# Patient Record
Sex: FEMALE | Race: WHITE | Employment: FULL TIME
[De-identification: names, ages, dates, MRNs, and addresses within clinical notes are randomized per-mention and may not be internally consistent; named-entity substitution may affect disease eponyms.]

---

## 2017-06-10 ENCOUNTER — HEALTH MAINTENANCE LETTER (OUTPATIENT)
Age: 37
End: 2017-06-10

## 2017-08-18 ENCOUNTER — OFFICE VISIT (OUTPATIENT)
Dept: FAMILY MEDICINE | Facility: CLINIC | Age: 37
End: 2017-08-18
Payer: COMMERCIAL

## 2017-08-18 VITALS
RESPIRATION RATE: 18 BRPM | DIASTOLIC BLOOD PRESSURE: 64 MMHG | BODY MASS INDEX: 36.32 KG/M2 | SYSTOLIC BLOOD PRESSURE: 132 MMHG | HEART RATE: 69 BPM | TEMPERATURE: 96.5 F | OXYGEN SATURATION: 97 % | HEIGHT: 66 IN | WEIGHT: 226 LBS

## 2017-08-18 DIAGNOSIS — Z80.1 FAMILY HISTORY OF LUNG CANCER: ICD-10-CM

## 2017-08-18 DIAGNOSIS — E66.01 MORBID OBESITY DUE TO EXCESS CALORIES (H): ICD-10-CM

## 2017-08-18 DIAGNOSIS — E55.9 VITAMIN D DEFICIENCY: ICD-10-CM

## 2017-08-18 DIAGNOSIS — R73.01 IMPAIRED FASTING GLUCOSE: ICD-10-CM

## 2017-08-18 DIAGNOSIS — Z87.891 PERSONAL HISTORY OF TOBACCO USE, PRESENTING HAZARDS TO HEALTH: ICD-10-CM

## 2017-08-18 DIAGNOSIS — E66.01 MORBID OBESITY DUE TO EXCESS CALORIES (H): Primary | ICD-10-CM

## 2017-08-18 DIAGNOSIS — E78.00 HYPERCHOLESTEROLEMIA: ICD-10-CM

## 2017-08-18 DIAGNOSIS — R53.83 FATIGUE, UNSPECIFIED TYPE: ICD-10-CM

## 2017-08-18 DIAGNOSIS — N90.7 LABIAL CYST: ICD-10-CM

## 2017-08-18 LAB
CHOLEST SERPL-MCNC: 166 MG/DL
GLUCOSE SERPL-MCNC: 89 MG/DL (ref 70–99)
HDLC SERPL-MCNC: 53 MG/DL
HGB BLD-MCNC: 14.8 G/DL (ref 11.7–15.7)
LDLC SERPL CALC-MCNC: 94 MG/DL
NONHDLC SERPL-MCNC: 113 MG/DL
TRIGL SERPL-MCNC: 93 MG/DL
TSH SERPL DL<=0.005 MIU/L-ACNC: 1.02 MU/L (ref 0.4–4)

## 2017-08-18 PROCEDURE — 82306 VITAMIN D 25 HYDROXY: CPT | Performed by: INTERNAL MEDICINE

## 2017-08-18 PROCEDURE — 36415 COLL VENOUS BLD VENIPUNCTURE: CPT | Performed by: INTERNAL MEDICINE

## 2017-08-18 PROCEDURE — 84443 ASSAY THYROID STIM HORMONE: CPT | Performed by: INTERNAL MEDICINE

## 2017-08-18 PROCEDURE — 82947 ASSAY GLUCOSE BLOOD QUANT: CPT | Performed by: INTERNAL MEDICINE

## 2017-08-18 PROCEDURE — 80061 LIPID PANEL: CPT | Performed by: INTERNAL MEDICINE

## 2017-08-18 PROCEDURE — 85018 HEMOGLOBIN: CPT | Performed by: INTERNAL MEDICINE

## 2017-08-18 PROCEDURE — 99215 OFFICE O/P EST HI 40 MIN: CPT | Performed by: FAMILY MEDICINE

## 2017-08-18 NOTE — NURSING NOTE
"Chief Complaint   Patient presents with     New Patient     Vaginal Problem     Derm Problem     /64  Pulse 69  Temp 96.5  F (35.8  C) (Tympanic)  Resp 18  Ht 5' 5.75\" (1.67 m)  Wt 226 lb (102.5 kg)  LMP  (Exact Date)  SpO2 97%  Breastfeeding? No  BMI 36.76 kg/m2 Estimated body mass index is 36.76 kg/(m^2) as calculated from the following:    Height as of this encounter: 5' 5.75\" (1.67 m).    Weight as of this encounter: 226 lb (102.5 kg).  BP completed using cuff size: rocio Tolliver CMA    Health Maintenance Due   Topic Date Due     TOBACCO CESSATION COUNSELING Q1 YR  1978     LIPID MONITORING Q1 YEAR  11/01/2013     MAMMO Q1 YR  10/16/2014     Health Maintenance reviewed at today's visit patient asked to schedule/complete:   None, Health Maintenance up to date.  Breast Cancer:  Patient agrees to schedule    "

## 2017-08-18 NOTE — LETTER
Jaceksilvestrevidhi EMELI Luba  [address] [place] [id number]        August 22, 2017          Dear ,    We are writing to inform you of your test results.    They are all normal     THE FOLLOWING ARE EXPLANATIONS OF SOME OF OUR LAB TESTS     YOU DID NOT NECESSARILY HAVE ALL OF THESE DONE     Hgb is the blood iron level   WBC means White Blood Cells   Platelets are small blood cells that help with forming the blood clots along with other blood factors.   Electrolytes are Sodium, Potassium, Calcium, Magnesium, Phosphorus.   Liver tests are: AST, ALT, Bilirubin, Alkaline Phosphatase.   Kidney tests are Creatinine, GFR.   HDL Cholesterol - is the good cholesterol and it is good to have it high.   LDL cholesterol is the bad cholesterol and it is good to have it low.   It is recommended to have LDL less than 130 for people with hypertension and to have it less than 100 for people with heart disease, diabetes and chronic kidney disease.   Triglycerides are another type of lipid that can cause heart disease, like the cholesterol and should be kept low   Thyroid tests are TSH, T4, T3   Glucose is sugar.   A1c is a test that gives us an idea about how well was controlled the diabetes for the last 3 months.   PSA stands for Prostate Specific Antigen and it can be elevated with prostate cancer or prostate inflammation.     Your vitamin D is normal.  Please take 1,000 units in the summer and 2,000 units in the winter to maintain it     Please continue on the same medications     Resulted Orders   TSH with free T4 reflex   Result Value Ref Range    TSH 1.02 0.40 - 4.00 mU/L   Glucose   Result Value Ref Range    Glucose 89 70 - 99 mg/dL      Comment:      Non Fasting   Lipid Profile   Result Value Ref Range    Cholesterol 166 <200 mg/dL    Triglycerides 93 <150 mg/dL      Comment:      Non Fasting    HDL Cholesterol 53 >49 mg/dL    LDL Cholesterol Calculated 94 <100 mg/dL      Comment:      Desirable:       <100 mg/dl     Non HDL Cholesterol 113 <130 mg/dL   Hemoglobin   Result Value Ref Range    Hemoglobin 14.8 11.7 - 15.7 g/dL   Vitamin D Deficiency   Result Value Ref Range    Vitamin D Deficiency screening 21 20 - 75 ug/L      Comment:      Season, race, dietary intake, and treatment affect the concentration of   25-hydroxy-Vitamin D. Values may decrease during winter months and increase   during summer months. Values 20-29 ug/L may indicate Vitamin D insufficiency   and values <20 ug/L may indicate Vitamin D deficiency.  Vitamin D determination is routinely performed by an immunoassay specific for   25 hydroxyvitamin D3.  If an individual is on vitamin D2 (ergocalciferol)   supplementation, please specify 25 OH vitamin D2 and D3 level determination by   LCMSMS test [id number].         If you have any questions or concerns, please call the clinic at the number listed above.       Sincerely,        Ivelisse Gonzalez MD

## 2017-08-18 NOTE — MR AVS SNAPSHOT
After Visit Summary   8/18/2017    Ancelmo Verduzco    MRN: [id number]           Patient Information     Date Of Birth          1980        Visit Information        Provider Department      8/18/2017 10:00 AM Ivelisse Gonzalez MD [facility]        Today's Diagnoses     Morbid obesity due to excess calories (H): BMI+=36.8 w comorbid glu intol, hi LDL     -  1    Visit for screening mammogram        Fatigue, unspecified type        Screening cholesterol level        Encounter for screening mammogram for breast cancer        Personal history of tobacco use, presenting hazards to health: [age]y/o @ 1ppd=18pk yr hx-issue of treating         Hypercholesterolemia        Impaired fasting glucose        Family history of lung cancer        Vitamin D deficiency          Care Instructions    1.  Weight Loss Tips  1. Do not eat after 6 hrs before your expected bedtime  2. Have your heaviest meal for breakfast, a slightly lighter meal at lunch and a snack 6 hrs before bed  3. No sugar/calorie drinks except milk ie no fruit juice, pop, alcohol.  4. Drink milk 30min before meals to decrease your hunger. Also it is excellent as part of your last meal of the day snack  5. Drink lots of water  6. Increase fiber in diet: all bran cereal, salads, popcorn etc  7. Have only one small serving of fruit a day about 1/2 cup (as this is high in sugar)  8. EXERCISE is the bottom line. Without it, you will gain weight even on a low calorie diet. Best if done 2-3X a day as can    Being overweight contributes to high blood pressure and high cholesterol, both of which cause heart attacks, strokes and kidney failure, prediabetes and diabetes, arthritis, and liver disease     3. . Schedule your mammo at [facility]  At [address] at [phone]      4. QUIT SMOKING !!!     5. Go up to 2 x  A day wellbutrin and on the 4-5 days , quit  & exercise !!!     6. See ,me next wk  "          Follow-ups after your visit        Follow-up notes from your care team     Return in about 1 week (around 8/25/2017) for f/u.      Who to contact     If you have questions or need follow up information about today's clinic visit or your schedule please [facility] at [phone].  Normal or non-critical lab and imaging results will be communicated to you by MyChart, letter or phone within 4 business days after the clinic has received the results. If you do not hear from us within 7 days, please contact the clinic through [employer] or phone. If you have a critical or abnormal lab result, we will notify you by phone as soon as possible.  Submit refill requests through [email or web address] or call your pharmacy and they will forward the refill request to us. Please allow 3 business days for your refill to be completed.          Additional Information About Your Visit        [employer] Information     [email or web address] gives you secure access to your electronic health record. If you see a primary care provider, you can also send messages to your care team and make appointments. If you have questions, please call your primary care clinic.  If you do not have a primary care provider, please call [phone] and they will assist you.        Care EveryWhere ID     This is your Care EveryWhere ID. This could be used by other organizations to access your [facility] medical records  [id number]        Your Vitals Were     Pulse Temperature Respirations Height Last Period Pulse Oximetry    69 96.5  F (35.8  C) (Tympanic) 18 5' 5.75\" (1.67 m) (Exact Date) 97%    Breastfeeding? BMI (Body Mass Index)                No 36.76 kg/m2           Blood Pressure from Last 3 Encounters:   08/18/17 132/64   12/28/16 102/72   09/27/16 121/85    Weight from Last 3 Encounters:   08/18/17 226 lb (102.5 kg)   12/28/16 220 lb 4 oz (99.9 kg)   09/27/16 218 lb 3.2 oz (99 kg)              We Performed the Following     " *MA Screening Digital Bilateral     Glucose     Hemoglobin     Lipid Profile     TOBACCO CESSATION ORDER FOR HM     TSH with free T4 reflex     Vitamin D Deficiency        Primary Care Provider Office Phone # Fax #    Huber Urbano [phone]       NO INFO FOUND  [facility] [id number]        Equal Access to Services     CROWVINAY KLEBER : Hadii aad ku hadasho Soomaali, waaxda luqadaha, qaybta kaalmada adeegyada, waxay jessicain hayhillaryn adeselam radhabret [facility]. So [facility] [phone].    ATENCIÓN: Si habla español, tiene a aj disposición servicios gratuitos de asistencia lingüística. Nolberto al [phone].    We comply with applicable federal civil rights laws and [place] laws. We do not discriminate on the basis of race, color, national origin, age, disability sex, sexual orientation or gender identity.            Thank you!     Thank you for choosing [facility]  for your care. Our goal is always to provide you with excellent care. Hearing back from our patients is one way we can continue to improve our services. Please take a few minutes to complete the written survey that you may receive in the mail after your visit with us. Thank you!             Your Updated Medication List - Protect others around you: Learn how to safely use, store and throw away your medicines at www.disposemymeds.org.          This list is accurate as of: 8/18/17 12:32 PM.  Always use your most recent med list.                   Brand Name Dispense Instructions for use Diagnosis    buPROPion 150 MG 12 hr tablet    WELLBUTRIN SR    60 tablet    Take 1 tablet once daily and increase to 1 tablet twice daily after 4 days.  Start one week before your quit date.    Nicotine abuse       ibuprofen 800 MG tablet    ADVIL/MOTRIN    90 tablet    Take 1 tablet (800 mg) by mouth every 8 hours as needed for pain 1 tab po prn for cramps    Routine general medical examination at a health care facility,  Obesity, unspecified       levonorgestrel 20 MCG/24HR IUD    MIRENA     1 each (20 mcg) by Intrauterine route continuous    Encounter for IUD insertion

## 2017-08-18 NOTE — PROGRESS NOTES
SUBJECTIVE:   Ancelmo Verduzco is a 36 year old female who presents to clinic today for the following health issues:    New Patient/Transfer of Care    Labial Cyst - Rt Labia Majora      Duration: x 2 -3 months-recurrent with pus draainage last wk and now a firm nontender almond     Description (location/character/radiation): Cyst on Right side of groin area, upper abdomen and Neck    Intensity:  mild    Accompanying signs and symptoms: None    History (similar episodes/previous evaluation): None    Precipitating or alleviating factors: None    Therapies tried and outcome: None     Musculoskeletal problem/pain      Duration: x 2 weeks    Description  Location: Right Hand, Index Finger    Intensity:  moderate    Accompanying signs and symptoms: none    History  Previous similar problem: no   Previous evaluation:  none    Precipitating or alleviating factors:  Trauma or overuse: no   Aggravating factors include: none    Therapies tried and outcome: nothing    Medication Followup of Vitamin D     Taking Medication as prescribed: yes    Side Effects:  None    Medication Helping Symptoms:  Yes    Last level in 11-12 = 23     FATIGUE      Duration: off& on for yrs -worse now     Description (location/character/radiation): general    Intensity:  moderate    Accompanying signs and symptoms: 0    History (similar episodes/previous evaluation): None    Precipitating or alleviating factors: None    Therapies tried and outcome: None       TOBACCO ABUSE    -[age]y/o @ 1ppd=20 pk yr hx   -wants to quit --counselled on benefits     MORBID OBESITY    -BMI-=37   -comorbid glu intol, hi LDL        Glucose Intolerance   Follow-up      Patient is checking blood sugars: not at all    FBS to 155    Diabetic concerns: None     Symptoms of hypoglycemia (low blood sugar): none     Paresthesias (numbness or burning in feet) or sores: No     Date of last diabetic eye exam: 0    Hyperlipidemia:LDL Follow-Up      Rate your low fat/cholesterol  "diet?: not monitoring fat    Taking statin?  No    Other lipid medications/supplements?:  None     fam hx CAD            Problem list and histories reviewed & adjusted, as indicated.  Additional history: as documented    Labs reviewed in EPIC    Reviewed and updated as needed this visit by clinical staff  Allergies  Meds  Problems       Reviewed and updated as needed this visit by Provider         ROS:  C: NEGATIVE for fever, chills, change in weight-gaining ; tired  I: NEGATIVE for worrisome rashes, moles or lesions-cyst - labia  E: NEGATIVE for vision changes or irritation  E/M: NEGATIVE for ear, mouth and throat problems  R: NEGATIVE for significant cough or SOB  B: NEGATIVE for masses, tenderness or discharge  CV: NEGATIVE for chest pain, palpitations or peripheral edema  GI: NEGATIVE for nausea, abdominal pain, heartburn, or change in bowel habits  : NEGATIVE for frequency, dysuria, or hematuria  M: NEGATIVE for significant arthralgias or myalgia  N: NEGATIVE for weakness, dizziness or paresthesias  E: NEGATIVE for temperature intolerance, skin/hair changes  H: NEGATIVE for bleeding problems  P: NEGATIVE for changes in mood or affect    OBJECTIVE:     /64  Pulse 69  Temp 96.5  F (35.8  C) (Tympanic)  Resp 18  Ht 5' 5.75\" (1.67 m)  Wt 226 lb (102.5 kg)  LMP  (Exact Date)  SpO2 97%  Breastfeeding? No  BMI 36.76 kg/m2  Body mass index is 36.76 kg/(m^2).  GENERAL: healthy, alert, no distress and obese  EYES: Eyes grossly normal to inspection, PERRL and conjunctivae and sclerae normal  NECK: no adenopathy, no asymmetry, masses, or scars and thyroid normal to palpation  RESP: lungs clear to auscultation - no rales, rhonchi or wheezes  BREAST: normal without masses, tenderness or nipple discharge and no palpable axillary masses or adenopathy  CV: regular rate and rhythm, normal S1 S2, no S3 or S4, no murmur, click or rub, no peripheral edema and peripheral pulses strong  ABDOMEN: soft, nontender, " no hepatosplenomegaly, no masses and bowel sounds normal  GYN: firm almond in lat Rt labia majora -deep , nontender   MS: no gross musculoskeletal defects noted, no edema  SKIN: no suspicious lesions or rashes  NEURO: Normal strength and tone, mentation intact and speech normal  PSYCH: mentation appears normal, affect normal/bright    Diagnostic Test Results:  Results for orders placed or performed in visit on 08/18/17   TSH with free T4 reflex   Result Value Ref Range    TSH 1.02 0.40 - 4.00 mU/L   Glucose   Result Value Ref Range    Glucose 89 70 - 99 mg/dL   Lipid Profile   Result Value Ref Range    Cholesterol 166 <200 mg/dL    Triglycerides 93 <150 mg/dL    HDL Cholesterol 53 >49 mg/dL    LDL Cholesterol Calculated 94 <100 mg/dL    Non HDL Cholesterol 113 <130 mg/dL   Hemoglobin   Result Value Ref Range    Hemoglobin 14.8 11.7 - 15.7 g/dL   Vitamin D Deficiency   Result Value Ref Range    Vitamin D Deficiency screening 21 20 - 75 ug/L       ASSESSMENT/PLAN:               ICD-10-CM    1. Morbid obesity due to excess calories (H): BMI+=36.8 w comorbid glu intol, hi LDL  E66.01    2. Labial cyst-Rt labia majora-now scarred-hx of recurrence N90.7    3. Fatigue, unspecified type R53.83 TSH with free T4 reflex     Glucose     Hemoglobin     Vitamin D Deficiency     CANCELED: TSH with free T4 reflex     CANCELED: Hemoglobin   4. Personal history of tobacco use, presenting hazards to health: [age]y/o @ 1ppd=18pk yr hx-issue of treating  Z87.891 TOBACCO CESSATION ORDER FOR    5. Family history of lung cancer Z80.1    6. Hypercholesterolemia E78.00 CANCELED: Lipid panel reflex to direct LDL   7. Impaired fasting glucose R73.01    8. Vitamin D deficiency E55.9 CANCELED: Vitamin D Deficiency   9. Morbid obesity due to excess calories (H): BMI= 37 w comorbid glu intol, hi LDL E66.01        Patient Instructions   1.  Weight Loss Tips  1. Do not eat after 6 hrs before your expected bedtime  2. Have your heaviest meal for  breakfast, a slightly lighter meal at lunch and a snack 6 hrs before bed  3. No sugar/calorie drinks except milk ie no fruit juice, pop, alcohol.  4. Drink milk 30min before meals to decrease your hunger. Also it is excellent as part of your last meal of the day snack  5. Drink lots of water  6. Increase fiber in diet: all bran cereal, salads, popcorn etc  7. Have only one small serving of fruit a day about 1/2 cup (as this is high in sugar)  8. EXERCISE is the bottom line. Without it, you will gain weight even on a low calorie diet. Best if done 2-3X a day as can    Being overweight contributes to high blood pressure and high cholesterol, both of which cause heart attacks, strokes and kidney failure, prediabetes and diabetes, arthritis, and liver disease     3. . Schedule your mammo at [facility]  At [address] at [phone]      4. QUIT SMOKING !!!     5. Go up to 2 x  A day wellbutrin and on the 4-5 days , quit  & exercise !!!     6. See ,me next wk       Ivelisse Gonzalez MD  [facility]    Weight management plan: Discussed healthy diet and exercise guidelines and patient will follow up in 1 month in clinic to re-evaluate.    Discussed all with pt  And the patient expresses understanding  The fatigue could be from a combo of the infected cyst she's had the last 2 mo , low vit D , the obesity and lack of exercise     Ivelisse Gonzalez MD

## 2017-08-19 PROBLEM — N90.7 LABIAL CYST: Status: ACTIVE | Noted: 2017-08-19

## 2017-08-19 LAB — DEPRECATED CALCIDIOL+CALCIFEROL SERPL-MC: 21 UG/L (ref 20–75)

## 2017-08-21 NOTE — PROGRESS NOTES
.  Please see attached lab results  They are all normal     THE FOLLOWING ARE EXPLANATIONS OF SOME OF OUR LAB TESTS     YOU DID NOT NECESSARILY HAVE ALL OF THESE DONE     Hgb is the blood iron level  WBC means White Blood Cells  Platelets are small blood cells that help with forming the blood clots along with other blood factors.  Electrolytes are Sodium, Potassium, Calcium, Magnesium, Phosphorus.  Liver tests are: AST, ALT, Bilirubin, Alkaline Phosphatase.  Kidney tests are Creatinine, GFR.  HDL Cholesterol - is the good cholesterol and it is good to have it high.  LDL cholesterol is the bad cholesterol and it is good to have it low.  It is recommended to have LDL less than 130 for people with hypertension and to have it less than 100 for people with heart disease, diabetes and chronic kidney disease.  Triglycerides are another type of lipid that can cause heart disease, like the cholesterol and should be kept low   Thyroid tests are TSH, T4, T3  Glucose is sugar.  A1c is a test that gives us an idea about how well was controlled the diabetes for the last 3 months.   PSA stands for Prostate Specific Antigen and it can be elevated with prostate cancer or prostate inflammation.    Your vitamin D is normal.  Please take 1,000 units in the summer and 2,000 units in the winter to maintain it     Please continue on the same medications

## 2018-02-28 ENCOUNTER — OFFICE VISIT (OUTPATIENT)
Dept: PEDIATRICS | Facility: CLINIC | Age: 38
End: 2018-02-28
Payer: COMMERCIAL

## 2018-02-28 VITALS
TEMPERATURE: 97.9 F | HEART RATE: 72 BPM | BODY MASS INDEX: 37.45 KG/M2 | DIASTOLIC BLOOD PRESSURE: 78 MMHG | SYSTOLIC BLOOD PRESSURE: 110 MMHG | HEIGHT: 66 IN | WEIGHT: 233 LBS | OXYGEN SATURATION: 100 %

## 2018-02-28 DIAGNOSIS — N94.6 DYSMENORRHEA: ICD-10-CM

## 2018-02-28 DIAGNOSIS — Z72.0 TOBACCO ABUSE: ICD-10-CM

## 2018-02-28 DIAGNOSIS — L72.3 SEBACEOUS CYST: ICD-10-CM

## 2018-02-28 DIAGNOSIS — Z00.00 ROUTINE GENERAL MEDICAL EXAMINATION AT A HEALTH CARE FACILITY: Primary | ICD-10-CM

## 2018-02-28 DIAGNOSIS — Z82.49 FAMILY HISTORY OF EARLY CAD: ICD-10-CM

## 2018-02-28 PROCEDURE — 99385 PREV VISIT NEW AGE 18-39: CPT | Performed by: INTERNAL MEDICINE

## 2018-02-28 RX ORDER — VARENICLINE TARTRATE 1 MG/1
1 TABLET, FILM COATED ORAL 2 TIMES DAILY
Qty: 60 TABLET | Refills: 3 | Status: SHIPPED | OUTPATIENT
Start: 2018-02-28 | End: 2018-10-28

## 2018-02-28 RX ORDER — IBUPROFEN 800 MG/1
800 TABLET, FILM COATED ORAL EVERY 8 HOURS PRN
Qty: 90 TABLET | Refills: 3 | Status: SHIPPED | OUTPATIENT
Start: 2018-02-28 | End: 2019-08-02
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
## 2018-02-28 NOTE — PATIENT INSTRUCTIONS
I sent in prescription for Chantix.     Eileen Cowan     [facility] in [place]:   [phone] [id number], [place] [address]        Preventive Health Recommendations  Female Ages 26 - 39  Yearly exam:   See your health care provider every year in order to    Review health changes.     Discuss preventive care.      Review your medicines if you your doctor has prescribed any.    Until age 30: Get a Pap test every three years (more often if you have had an abnormal result).    After age 30: Talk to your doctor about whether you should have a Pap test every 3 years or have a Pap test with HPV screening every 5 years.   You do not need a Pap test if your uterus was removed (hysterectomy) and you have not had cancer.  You should be tested each year for STDs (sexually transmitted diseases), if you're at risk.   Talk to your provider about how often to have your cholesterol checked.  If you are at risk for diabetes, you should have a diabetes test (fasting glucose).  Shots: Get a flu shot each year. Get a tetanus shot every 10 years.   Nutrition:     Eat at least 5 servings of fruits and vegetables each day.    Eat whole-grain bread, whole-wheat pasta and brown rice instead of white grains and rice.    Talk to your provider about Calcium and Vitamin D.     Lifestyle    Exercise at least 150 minutes a week (30 minutes a day, 5 days of the week). This will help you control your weight and prevent disease.    Limit alcohol to one drink per day.    No smoking.     Wear sunscreen to prevent skin cancer.    See your dentist every six months for an exam and cleaning.

## 2018-02-28 NOTE — PROGRESS NOTES
"   SUBJECTIVE:   CC: Ancelmo Verduzco is an 37 year old woman who presents for preventive health visit.     Healthy Habits:    Do you get at least three servings of calcium containing foods daily (dairy, green leafy vegetables, etc.)? {YES/NO, DAIRY INTAKE:075599::\"yes\"}    Amount of exercise or daily activities, outside of work: {AMOUNT EXERCISE:268112}    Problems taking medications regularly {Yes /No default:595596::\"No\"}    Medication side effects: {Yes /No default.:355246::\"No\"}    Have you had an eye exam in the past two years? {YESNOBLANK:158741}    Do you see a dentist twice per year? {YESNOBLANK:662182}    Do you have sleep apnea, excessive snoring or daytime drowsiness?{YESNOBLANK:155917}  {Outside tests to abstract? :913103}    {additional problems to add (Optional):468595}    Today's PHQ-2 Score:   PHQ-2 ( 1999 [employer]) 2/27/2018 8/18/2017   Q1: Little interest or pleasure in doing things 0 0   Q2: Feeling down, depressed or hopeless 0 0   PHQ-2 Score 0 0   Q1: Little interest or pleasure in doing things Not at all -   Q2: Feeling down, depressed or hopeless Not at all -   PHQ-2 Score 0 -     {PHQ-2 LOOK IN ASSESSMENTS (Optional) :340877}  Abuse: Current or Past(Physical, Sexual or Emotional)- {YES/NO/NA:994974}  Do you feel safe in your environment - {YES/NO/NA:052458}    Social History   Substance Use Topics     Smoking status: Current Every Day Smoker     Packs/day: 1.00     Years: 18.00     Types: Cigarettes     Smokeless tobacco: Never Used      Comment: 1 ppd.       Alcohol use 0.0 oz/week     0 Standard drinks or equivalent per week      Comment: occasional     If you drink alcohol do you typically have >3 drinks per day or >7 drinks per week? {ETOH :886567}                     Reviewed orders with patient.  Reviewed health maintenance and updated orders accordingly - {Yes/No:718959::\"Yes\"}  {Chronicprobdata (Optional):068968}    {Mammo Decision Support (Optional):734802}    Pertinent mammograms " "are reviewed under the imaging tab.  History of abnormal Pap smear: {PAP HX:203137}    Reviewed and updated as needed this visit by clinical staff         Reviewed and updated as needed this visit by Provider        {HISTORY OPTIONS (Optional):772358}    ROS:  {FEMALE PREVENTATIVE ROS:508938}    OBJECTIVE:   There were no vitals taken for this visit.  EXAM:  {Exam Choices:083597}    ASSESSMENT/PLAN:   {Diag Picklist:877964}    COUNSELING:   {FEMALE COUNSELING MESSAGES:878700::\"Reviewed preventive health counseling, as reflected in patient instructions\"}    {BP Counseling- Complete if BP >= 120/80  (Optional):040247}     reports that she has been smoking Cigarettes.  She has a 18.00 pack-year smoking history. She has never used smokeless tobacco.  {Tobacco Cessation -- Complete if patient is a smoker (Optional):448067}  Estimated body mass index is 36.76 kg/(m^2) as calculated from the following:    Height as of 8/18/17: 5' 5.75\" (1.67 m).    Weight as of 8/18/17: 226 lb (102.5 kg).   {Weight Management Plan (ACO) Complete if BMI is abnormal-  Ages 18-64  BMI >24.9.  Age 65+ with BMI <23 or >30 (Optional):165023}    Counseling Resources:  ATP IV Guidelines  Pooled Cohorts Equation Calculator  Breast Cancer Risk Calculator  FRAX Risk Assessment  ICSI Preventive Guidelines  Dietary Guidelines for Americans, 2010  USDA's MyPlate  ASA Prophylaxis  Lung CA Screening    Flori Paz MD  [facility]  "

## 2018-02-28 NOTE — TELEPHONE ENCOUNTER
Plan does not cover medication. Please call plan [phone] to initiate prior authorization or call pharmacy to change medication.

## 2018-02-28 NOTE — MR AVS SNAPSHOT
After Visit Summary   2/28/2018    Ancelmo Verduzco    MRN: [id number]           Patient Information     Date Of Birth          1980        Visit Information        Provider Department      2/28/2018 3:00 PM Flori Paz MD [facility]        Today's Diagnoses     Routine general medical examination at a health care facility    -  1    Tobacco abuse        Dysmenorrhea        Family history of early CAD        Sebaceous cyst          Care Instructions    I sent in prescription for Chantix.     Eileen Cowan     [facility] [place]:   [phone] [address]  [address], [place] [address]        Preventive Health Recommendations  Female Ages 26 - 39  Yearly exam:   See your health care provider every year in order to    Review health changes.     Discuss preventive care.      Review your medicines if you your doctor has prescribed any.    Until age 30: Get a Pap test every three years (more often if you have had an abnormal result).    After age 30: Talk to your doctor about whether you should have a Pap test every 3 years or have a Pap test with HPV screening every 5 years.   You do not need a Pap test if your uterus was removed (hysterectomy) and you have not had cancer.  You should be tested each year for STDs (sexually transmitted diseases), if you're at risk.   Talk to your provider about how often to have your cholesterol checked.  If you are at risk for diabetes, you should have a diabetes test (fasting glucose).  Shots: Get a flu shot each year. Get a tetanus shot every 10 years.   Nutrition:     Eat at least 5 servings of fruits and vegetables each day.    Eat whole-grain bread, whole-wheat pasta and brown rice instead of white grains and rice.    Talk to your provider about Calcium and Vitamin D.     Lifestyle    Exercise at least 150 minutes a week (30 minutes a day, 5 days of the week). This will help you control your weight and prevent  disease.    Limit alcohol to one drink per day.    No smoking.     Wear sunscreen to prevent skin cancer.    See your dentist every six months for an exam and cleaning.            Follow-ups after your visit        Additional Services     DERMATOLOGY REFERRAL       Your provider has referred you to: [facility]: [facility] [place] [phone]   [email or web address]    Please be aware that coverage of these services is subject to the terms and limitations of your health insurance plan.  Call member services at your health plan with any benefit or coverage questions.      Please bring the following with you to your appointment:    (1) Any X-Rays, CTs or MRIs which have been performed.  Contact the facility where they were done to arrange for  prior to your scheduled appointment.    (2) List of current medications  (3) This referral request   (4) Any documents/labs given to you for this referral                  Who to contact     If you have questions or need follow up information about today's clinic visit or your schedule please contact [facility] directly at [phone].  Normal or non-critical lab and imaging results will be communicated to you by MyChart, letter or phone within 4 business days after the clinic has received the results. If you do not hear from us within 7 days, please contact the clinic through [facility] or phone. If you have a critical or abnormal lab result, we will notify you by phone as soon as possible.  Submit refill requests through [facility] or call your pharmacy and they will forward the refill request to us. Please allow 3 business days for your refill to be completed.          Additional Information About Your Visit        [facility] Information     [facility] gives you secure access to your electronic health record. If you see a primary care provider, you can also send messages to your care team and make appointments. If you have questions, please  "call your primary care clinic.  If you do not have a primary care provider, please call [phone] and they will assist you.        Care EveryWhere ID     This is your Care EveryWhere ID. This could be used by other organizations to access your [facility] medical records  [id number]        Your Vitals Were     Pulse Temperature Height Pulse Oximetry BMI (Body Mass Index)       72 97.9  F (36.6  C) (Oral) 5' 5.75\" (1.67 m) 100% 37.9 kg/m2        Blood Pressure from Last 3 Encounters:   02/28/18 110/78   08/18/17 132/64   12/28/16 102/72    Weight from Last 3 Encounters:   02/28/18 233 lb (105.7 kg)   08/18/17 226 lb (102.5 kg)   12/28/16 220 lb 4 oz (99.9 kg)              We Performed the Following     DERMATOLOGY REFERRAL          Today's Medication Changes          These changes are accurate as of 2/28/18  3:59 PM.  If you have any questions, ask your nurse or doctor.               Start taking these medicines.        Dose/Directions    * varenicline 0.5 MG X 11 & 1 MG X 42 tablet   Commonly known as:  CHANTIX STARTING MONTH RED   Used for:  Tobacco abuse   Started by:  Flori Paz MD        Take 0.5 mg tab daily for 3 days, then 0.5 mg tab twice daily for 4 days, then 1 mg twice daily.   Quantity:  53 tablet   Refills:  0       * varenicline 1 MG tablet   Commonly known as:  CHANTIX CONTINUING MONTH RED   Used for:  Tobacco abuse   Started by:  Flori Paz MD        Dose:  1 mg   Take 1 tablet (1 mg) by mouth 2 times daily   Quantity:  60 tablet   Refills:  3       * Notice:  This list has 2 medication(s) that are the same as other medications prescribed for you. Read the directions carefully, and ask your doctor or other care provider to review them with you.         Where to get your medicines      These medications were sent to [facility] [id number] [place] [address] AT [facility] [address]  [address] [place] [id number]     Phone:  [phone]     " ibuprofen 800 MG tablet         Some of these will need a paper prescription and others can be bought over the counter.  Ask your nurse if you have questions.     Bring a paper prescription for each of these medications     varenicline 1 MG tablet         Call your pharmacy to confirm that your medication is ready for pickup. It may take up to 24 hours for them to receive the prescription. If the prescription is not ready within 3 business days, please contact your clinic or your provider.     We will let you know when these medications are ready. If you don't hear back within 3 business days, please contact us.     varenicline 0.5 MG X 11 & 1 MG X 42 tablet                Primary Care Provider Office Phone # Fax #    Flori Paz [phone] [address] [place] [address]        Equal Access to Services     [facility] : Allen delgado Soeaston, waaxda luqadaha, qaybta kaalmada adeselamyajerardo, adolfo cedeno . [facility] [phone].    ATENCIÓN: Si habla español, tiene a aj disposición servicios gratuitos de asistencia lingüística. [facility] [phone].    We comply with applicable federal civil rights laws and [place] laws. We do not discriminate on the basis of race, color, national origin, age, disability, sex, sexual orientation, or gender identity.            Thank you!     Thank you for choosing [facility]  for your care. Our goal is always to provide you with excellent care. Hearing back from our patients is one way we can continue to improve our services. Please take a few minutes to complete the written survey that you may receive in the mail after your visit with us. Thank you!             Your Updated Medication List - Protect others around you: Learn how to safely use, store and throw away your medicines at www.disposemymeds.org.          This list is accurate as of 2/28/18  3:59 PM.  Always use your most recent  med list.                   Brand Name Dispense Instructions for use Diagnosis    buPROPion 150 MG 12 hr tablet    WELLBUTRIN SR    60 tablet    Take 1 tablet once daily and increase to 1 tablet twice daily after 4 days.  Start one week before your quit date.    Nicotine abuse       cholecalciferol 5000 UNITS Caps capsule    vitamin D3     Take 5,000 Units by mouth daily        ibuprofen 800 MG tablet    ADVIL/MOTRIN    90 tablet    Take 1 tablet (800 mg) by mouth every 8 hours as needed for pain 1 tab po prn for cramps    Routine general medical examination at a health care facility       levonorgestrel 20 MCG/24HR IUD    MIRENA     1 each (20 mcg) by Intrauterine route continuous    Encounter for IUD insertion       * varenicline 0.5 MG X 11 & 1 MG X 42 tablet    CHANTIX STARTING MONTH RED    53 tablet    Take 0.5 mg tab daily for 3 days, then 0.5 mg tab twice daily for 4 days, then 1 mg twice daily.    Tobacco abuse       * varenicline 1 MG tablet    CHANTIX CONTINUING MONTH RED    60 tablet    Take 1 tablet (1 mg) by mouth 2 times daily    Tobacco abuse       * Notice:  This list has 2 medication(s) that are the same as other medications prescribed for you. Read the directions carefully, and ask your doctor or other care provider to review them with you.

## 2018-03-01 ENCOUNTER — TELEPHONE (OUTPATIENT)
Dept: PEDIATRICS | Facility: CLINIC | Age: 38
End: 2018-03-01

## 2018-03-01 RX ORDER — VARENICLINE TARTRATE 1 MG/1
1 TABLET, FILM COATED ORAL 2 TIMES DAILY
Qty: 60 TABLET | Refills: 3 | OUTPATIENT
Start: 2018-03-01

## 2018-03-01 NOTE — TELEPHONE ENCOUNTER
Prior Authorization Retail Medication Request  Medication/Dose: Chantix continuing month pack.    Diagnosis and ICD code: Z72.0  New/Renewal/Insurance Change PA: New  Previously Tried and Failed Therapies: None    Insurance ID (if provided): [employer] Member id. [id number] Group number [id number]  Insurance Phone (if provided): [phone]    Any additional info from fax request:     If you received a fax notification from an outside Pharmacy: [facility] [id number]  [facility]: Phone [employer] phone [phone]    Pharmacy Fax: [phone].

## 2018-03-01 NOTE — TELEPHONE ENCOUNTER
"Requested Prescriptions   Pending Prescriptions Disp Refills     varenicline (CHANTIX CONTINUING MONTH PAK) 1 MG tablet  Last Written Prescription Date:  02/28/2018  Last Fill Quantity: 60 tablet,  # refills: 3   Last office visit: 02/28/2018 previous visit found with prescribing provider:     Flori Paz MD          Future Office Visit:       60 tablet 3     Sig: Take 1 tablet (1 mg) by mouth 2 times daily    Partial Cholinergic Nicotinic Agonist Agents Passed    2/28/2018  5:23 PM       Passed - Blood pressure under 140/90 in past 12 months    BP Readings from Last 3 Encounters:   02/28/18 110/78   08/18/17 132/64   12/28/16 102/72                Passed - Recent or future visit with authorizing provider's specialty    Patient had office visit in the last year or has a visit in the next 30 days with authorizing provider.  See \"Patient Info\" tab in inbasket, or \"Choose Columns\" in Meds & Orders section of the refill encounter.            Passed - Patient is 18 years of age or older       Passed - Patient is not pregnant       Passed - No positive pregnancy test on file in past 12 months        varenicline (CHANTIX STARTING MONTH RED) 0.5 MG X 11 & 1 MG X 42 tablet  Last Written Prescription Date:  02/28/2018  Last Fill Quantity: 53 tablet,  # refills: 0   Last office visit: 02/28/2018 previous visit found with prescribing provider:      Flori Paz MD         Future Office Visit:     53 tablet 0     Sig: Take 0.5 mg tab daily for 3 days, then 0.5 mg tab twice daily for 4 days, then 1 mg twice daily.    Partial Cholinergic Nicotinic Agonist Agents Passed    2/28/2018  5:23 PM       Passed - Blood pressure under 140/90 in past 12 months    BP Readings from Last 3 Encounters:   02/28/18 110/78   08/18/17 132/64   12/28/16 102/72                Passed - Recent or future visit with authorizing provider's specialty    Patient had office visit in the last year or has a visit in the next 30 days " "with authorizing provider.  See \"Patient Info\" tab in inbasket, or \"Choose Columns\" in Meds & Orders section of the refill encounter.            Passed - Patient is 18 years of age or older       Passed - Patient is not pregnant       Passed - No positive pregnancy test on file in past 12 months          "

## 2018-03-06 NOTE — TELEPHONE ENCOUNTER
PA Initiation    Medication: CHANTIX  Insurance Company: [employer] ([facility]) - Phone [phone] Fax [phone]  [facility] [address] [place] [address] [address] AT [facility] [address] [address]  Filling Pharmacy Phone: [phone]  Filling Pharmacy Fax:    Start Date: 3/6/2018

## 2018-03-08 NOTE — TELEPHONE ENCOUNTER
Prior Authorization Approval    Authorization Effective Date:    Authorization Expiration Date:    Medication: CHANTIX - APPROVED  Approved Dose/Quantity: DAILY  Reference #: [id number]   Insurance Company: [employer] ([facility]) - Phone [phone] Fax [phone]  Expected CoPay: NA     CoPay Card Available:      Foundation Assistance Needed:    Which Pharmacy is filling the prescription (Not needed for infusion/clinic administered): [facility] [address] [place] [address] [place] [facility] [address] [address]  Pharmacy Notified: Yes  Patient Notified: Yes

## 2018-06-16 ENCOUNTER — HEALTH MAINTENANCE LETTER (OUTPATIENT)
Age: 38
End: 2018-06-16

## 2018-10-27 ENCOUNTER — MYC MEDICAL ADVICE (OUTPATIENT)
Dept: PEDIATRICS | Facility: CLINIC | Age: 38
End: 2018-10-27

## 2018-10-27 DIAGNOSIS — Z72.0 TOBACCO ABUSE: Primary | ICD-10-CM

## 2018-10-28 RX ORDER — VARENICLINE TARTRATE 1 MG/1
1 TABLET, FILM COATED ORAL 2 TIMES DAILY
Qty: 60 TABLET | Refills: 3 | Status: SHIPPED | OUTPATIENT
Start: 2018-10-28

## 2018-10-28 RX ORDER — ONDANSETRON 4 MG/1
4 TABLET, FILM COATED ORAL EVERY 8 HOURS PRN
Qty: 30 TABLET | Refills: 3 | Status: SHIPPED | OUTPATIENT
Start: 2018-10-28 | End: 2019-07-12

## 2019-03-29 ENCOUNTER — TELEPHONE (OUTPATIENT)
Dept: PEDIATRICS | Facility: CLINIC | Age: 39
End: 2019-03-29

## 2019-03-29 NOTE — TELEPHONE ENCOUNTER
Prior Authorization Retail Medication Request    Medication/Dose:   ICD code (if different than what is on RX):   Previously Tried and Failed:  NA  Rationale:  NA     Insurance Name: [employer]   Insurance ID:  [id number]      Pharmacy Information (if different than what is on RX)  Name:    Phone:

## 2019-04-18 ENCOUNTER — TELEPHONE (OUTPATIENT)
Dept: PEDIATRICS | Facility: CLINIC | Age: 39
End: 2019-04-18

## 2019-04-18 NOTE — TELEPHONE ENCOUNTER
Prior Authorization Retail Medication Request    Medication/Dose: chantix   ICD code (if different than what is on RX):    Previously Tried and Failed:    NA   Rationale:  NA     Insurance Name:  [employer] care   Insurance ID:  [id number]      Pharmacy Information (if different than what is on RX)  Name:    Phone:

## 2019-07-11 ENCOUNTER — OFFICE VISIT (OUTPATIENT)
Dept: PEDIATRICS | Facility: CLINIC | Age: 39
End: 2019-07-11
Payer: COMMERCIAL

## 2019-07-11 VITALS
HEIGHT: 67 IN | OXYGEN SATURATION: 98 % | DIASTOLIC BLOOD PRESSURE: 74 MMHG | BODY MASS INDEX: 35.31 KG/M2 | TEMPERATURE: 98.7 F | HEART RATE: 77 BPM | SYSTOLIC BLOOD PRESSURE: 122 MMHG | WEIGHT: 225 LBS

## 2019-07-11 DIAGNOSIS — S60.221A CONTUSION OF RIGHT HAND, INITIAL ENCOUNTER: Primary | ICD-10-CM

## 2019-07-11 DIAGNOSIS — L72.0 EPIDERMAL CYST: ICD-10-CM

## 2019-07-11 PROCEDURE — 99213 OFFICE O/P EST LOW 20 MIN: CPT | Performed by: NURSE PRACTITIONER

## 2019-07-11 ASSESSMENT — MIFFLIN-ST. JEOR: SCORE: 1715.28

## 2019-07-11 NOTE — PATIENT INSTRUCTIONS
You can try to apply moist heat to the area by your vagina or soaking in bath for comfort. IT does not look infected- but watch for any redness, swelling, drainage, or increased pain.    You can apply ice to your hand.

## 2019-07-11 NOTE — PROGRESS NOTES
"Subjective     Ancelmo Verduzco is a 38 year old female who presents to clinic today for the following health issues:    HPI   Bruise   Onset: Tuesday morning     Description:   Location: right top of hand   Character: tender while touching  Itching (Pruritis): no     Progression of Symptoms:  same    Accompanying Signs & Symptoms:  Fever: no   Body aches or joint pain: no   Sore throat symptoms: no   Recent cold symptoms: no     History:   Previous similar spot: no     Precipitating factors:   Exposure to similar spot: no   New exposures: None   Recent travel: no     Alleviating factors:  none    Therapies Tried and outcome:none    Patient would like to have ingrown pubic hair looked at if time    Reviewed and updated as needed this visit by Provider  Meds  Problems         Review of Systems   Otherwise ROS is negative except as stated above.        Objective    /74 (BP Location: Right arm, Patient Position: Chair, Cuff Size: Adult Regular)   Pulse 77   Temp 98.7  F (37.1  C) (Tympanic)   Ht 1.689 m (5' 6.5\")   Wt 102.1 kg (225 lb)   SpO2 98%   BMI 35.77 kg/m    Body mass index is 35.77 kg/m .  Physical Exam   GENERAL: healthy, alert and no distress  SKIN:  x1 round mobile and firm skin colored nodule to mons pubis approx 0.5 cm x 0.5 cm, no redness, pustule, or drainage noted, non-tender to palpation  small slightly tender contusion to right hand at base of 2nd and 3rd fingers    Diagnostic Test Results:  none         Assessment & Plan     1. Contusion of right hand, initial encounter  May ice and use ibuprofen as needed.     2. Epidermal cyst  Noted to mons pubis. Not infected or inflamed at this time. Discussed signs/symptoms to watch for, avoid picking, may soak for comfort if needed.       See Patient Instructions    Return in about 1 week (around 7/18/2019) for Follow-up if symptoms do not improve or worsen.    Alta Rodriguez NP  [facility]      "

## 2019-07-12 ENCOUNTER — MYC MEDICAL ADVICE (OUTPATIENT)
Dept: PEDIATRICS | Facility: CLINIC | Age: 39
End: 2019-07-12

## 2019-07-12 DIAGNOSIS — Z72.0 TOBACCO ABUSE: ICD-10-CM

## 2019-07-12 RX ORDER — VARENICLINE TARTRATE 1 MG/1
1 TABLET, FILM COATED ORAL 2 TIMES DAILY
Qty: 180 TABLET | Refills: 0 | Status: SHIPPED | OUTPATIENT
Start: 2019-08-09 | End: 2019-08-02

## 2019-07-12 RX ORDER — ONDANSETRON 4 MG/1
4 TABLET, FILM COATED ORAL EVERY 8 HOURS PRN
Qty: 30 TABLET | Refills: 3 | Status: SHIPPED | OUTPATIENT
Start: 2019-07-12

## 2019-07-12 NOTE — TELEPHONE ENCOUNTER
Dr Paz,    Please see pt't note.  Rx's queued up. Please sign if ok. She has appt on 8/2 with you.    Thanks,    Alta Carlisle, RN, BSN

## 2019-07-17 ENCOUNTER — MYC MEDICAL ADVICE (OUTPATIENT)
Dept: PEDIATRICS | Facility: CLINIC | Age: 39
End: 2019-07-17

## 2019-07-25 NOTE — TELEPHONE ENCOUNTER
Central Prior Authorization Team   Phone: [phone]      PA Initiation    Medication: chantix-Initiated  Insurance Company: [employer] ([facility]) - Phone [phone] Fax [phone]  Pharmacy Filling the Rx: [id number] [place], [place] - [address] AT [facility] [address]  Filling Pharmacy Phone: [phone]  Filling Pharmacy Fax:    Start Date: 7/25/2019

## 2019-07-25 NOTE — TELEPHONE ENCOUNTER
Prior Authorization Approval    Authorization Effective Date: 7/18/2019  Authorization Expiration Date: 7/18/2020  Medication: chantix-APPROVED  Approved Dose/Quantity:   Reference #:     Insurance Company: Ana ([facility]) - Phone [phone] Fax [phone]  Expected CoPay:       CoPay Card Available:      Foundation Assistance Needed:    Which Pharmacy is filling the prescription (Not needed for infusion/clinic administered[id number] [address] AT [facility] [address]  Pharmacy Notified: Yes  Patient Notified: No    Pharmacy will notify patient when medication is ready.

## 2019-08-01 NOTE — PROGRESS NOTES
"   SUBJECTIVE:   CC: Ancelmo Verduzco is an 38 year old woman who presents for preventive health visit.     Healthy Habits:    Do you get at least three servings of calcium containing foods daily (dairy, green leafy vegetables, etc.)? { :622968::\"yes\"}    Amount of exercise or daily activities, outside of work: { :392395}    Problems taking medications regularly { :125638::\"No\"}    Medication side effects: { :278396::\"No\"}    Have you had an eye exam in the past two years? { :445896}    Do you see a dentist twice per year? { :214365}    Do you have sleep apnea, excessive snoring or daytime drowsiness?{ :778190}  {Outside tests to abstract? :957853}    {additional problems to add (Optional):539500}    Today's PHQ-2 Score:   PHQ-2 ( 1999 [employer]) 2/27/2018 8/18/2017   Q1: Little interest or pleasure in doing things 0 0   Q2: Feeling down, depressed or hopeless 0 0   PHQ-2 Score 0 0   Q1: Little interest or pleasure in doing things Not at all -   Q2: Feeling down, depressed or hopeless Not at all -   PHQ-2 Score 0 -     {PHQ-2 LOOK IN ASSESSMENTS (Optional) :219721}  Abuse: Current or Past(Physical, Sexual or Emotional)- {YES/NO/NA:435630}  Do you feel safe in your environment? {YES/NO/NA:539218}    Social History     Tobacco Use     Smoking status: Current Every Day Smoker     Packs/day: 1.00     Years: 18.00     Pack years: 18.00     Types: Cigarettes     Smokeless tobacco: Never Used     Tobacco comment: 1 ppd.     Substance Use Topics     Alcohol use: Yes     Alcohol/week: 0.0 oz     Comment: occasional     If you drink alcohol do you typically have >3 drinks per day or >7 drinks per week? {ETOH :642584}                     Reviewed orders with patient.  Reviewed health maintenance and updated orders accordingly - {Yes/No:347635::\"Yes\"}  {Chronicprobdata (Optional):799038}    {Mammo Decision Support (Optional):494988}    Pertinent mammograms are reviewed under the imaging tab.  History of abnormal Pap smear: {PAP " "HX:066480}  PAP / HPV Latest Ref Rng & Units 9/27/2016 10/26/2012 6/15/2009   PAP - NIL NIL NIL   HPV 16 DNA NEG Negative - -   HPV 18 DNA NEG Negative - -   OTHER HR HPV NEG Negative - -     Reviewed and updated as needed this visit by clinical staff         Reviewed and updated as needed this visit by Provider        {HISTORY OPTIONS (Optional):616154}    ROS:  { :182140}    OBJECTIVE:   There were no vitals taken for this visit.  EXAM:  {Exam Choices:027342}    {Diagnostic Test Results (Optional):600665::\"Diagnostic Test Results:\",\"Labs reviewed in Epic\"}    ASSESSMENT/PLAN:   {Diag Picklist:645235}    COUNSELING:   {FEMALE COUNSELING MESSAGES:530877::\"Reviewed preventive health counseling, as reflected in patient instructions\"}    Estimated body mass index is 35.77 kg/m  as calculated from the following:    Height as of 7/11/19: 1.689 m (5' 6.5\").    Weight as of 7/11/19: 102.1 kg (225 lb).    {Weight Management Plan (ACO) Complete if BMI is abnormal-  Ages 18-64  BMI >24.9.  Age 65+ with BMI <23 or >30 (Optional):581783}     reports that she has been smoking cigarettes.  She has a 18.00 pack-year smoking history. She has never used smokeless tobacco.  {Tobacco Cessation -- Complete if patient is a smoker (Optional):564297}    Counseling Resources:  ATP IV Guidelines  Pooled Cohorts Equation Calculator  Breast Cancer Risk Calculator  FRAX Risk Assessment  ICSI Preventive Guidelines  Dietary Guidelines for Americans, 2010  USDA's MyPlate  ASA Prophylaxis  Lung CA Screening    Flori Paz MD  [facility]  "

## 2019-08-01 NOTE — PATIENT INSTRUCTIONS
Ice a couple times a day; use frozen pop bottle and roll over arches daily.     Complete stretches 2-3x per day standing on step dropping heel back or on the wall.     [facility] [place]  Dr. Eileen Cowan   [phone] [address]  [address], [place] [address]        Preventive Health Recommendations  Female Ages 40 to 49    Yearly exam:     See your health care provider every year in order to  1. Review health changes.   2. Discuss preventive care.    3. Review your medicines if your doctor prescribed any.      Get a Pap test every three years (unless you have an abnormal result and your provider advises testing more often).      If you get Pap tests with HPV test, you only need to test every 5 years, unless you have an abnormal result. You do not need a Pap test if your uterus was removed (hysterectomy) and you have not had cancer.      You should be tested each year for STDs (sexually transmitted diseases), if you're at risk.     Ask your doctor if you should have a mammogram.      Have a colonoscopy (test for colon cancer) if someone in your family has had colon cancer or polyps before age 50.       Have a cholesterol test every 5 years.       Have a diabetes test (fasting glucose) after age 45. If you are at risk for diabetes, you should have this test every 3 years.    Shots: Get a flu shot each year. Get a tetanus shot every 10 years.     Nutrition:     Eat at least 5 servings of fruits and vegetables each day.    Eat whole-grain bread, whole-wheat pasta and brown rice instead of white grains and rice.    Get adequate Calcium and Vitamin D.      Lifestyle    Exercise at least 150 minutes a week (an average of 30 minutes a day, 5 days a week). This will help you control your weight and prevent disease.    Limit alcohol to one drink per day.    No smoking.     Wear sunscreen to prevent skin cancer.    See your dentist every six months for an exam and cleaning.  Preventive Health  Recommendations  Female Ages 40 to 49    Yearly exam:   See your health care provider every year in order to  Review health changes.   Discuss preventive care.    Review your medicines if your doctor prescribed any.    Get a Pap test every three years (unless you have an abnormal result and your provider advises testing more often).    If you get Pap tests with HPV test, you only need to test every 5 years, unless you have an abnormal result. You do not need a Pap test if your uterus was removed (hysterectomy) and you have not had cancer.    You should be tested each year for STDs (sexually transmitted diseases), if you're at risk.   Ask your doctor if you should have a mammogram.    Have a colonoscopy (test for colon cancer) if someone in your family has had colon cancer or polyps before age 50.     Have a cholesterol test every 5 years.     Have a diabetes test (fasting glucose) after age 45. If you are at risk for diabetes, you should have this test every 3 years.    Shots: Get a flu shot each year. Get a tetanus shot every 10 years.     Nutrition:   Eat at least 5 servings of fruits and vegetables each day.  Eat whole-grain bread, whole-wheat pasta and brown rice instead of white grains and rice.  Get adequate Calcium and Vitamin D.      Lifestyle  Exercise at least 150 minutes a week (an average of 30 minutes a day, 5 days a week). This will help you control your weight and prevent disease.  Limit alcohol to one drink per day.  No smoking.   Wear sunscreen to prevent skin cancer.  See your dentist every six months for an exam and cleaning.
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
## 2019-08-02 ENCOUNTER — OFFICE VISIT (OUTPATIENT)
Dept: PEDIATRICS | Facility: CLINIC | Age: 39
End: 2019-08-02
Payer: COMMERCIAL

## 2019-08-02 DIAGNOSIS — M79.672 HEEL PAIN, BILATERAL: ICD-10-CM

## 2019-08-02 DIAGNOSIS — L57.8 SUN-DAMAGED SKIN: ICD-10-CM

## 2019-08-02 DIAGNOSIS — Z23 NEED FOR TDAP VACCINATION: ICD-10-CM

## 2019-08-02 DIAGNOSIS — Z72.0 TOBACCO ABUSE: ICD-10-CM

## 2019-08-02 DIAGNOSIS — M79.671 HEEL PAIN, BILATERAL: ICD-10-CM

## 2019-08-02 DIAGNOSIS — Z12.31 VISIT FOR SCREENING MAMMOGRAM: ICD-10-CM

## 2019-08-02 DIAGNOSIS — Z00.00 ROUTINE GENERAL MEDICAL EXAMINATION AT A HEALTH CARE FACILITY: Primary | ICD-10-CM

## 2019-08-02 PROCEDURE — 99396 PREV VISIT EST AGE 40-64: CPT | Mod: 25 | Performed by: INTERNAL MEDICINE

## 2019-08-02 PROCEDURE — 90715 TDAP VACCINE 7 YRS/> IM: CPT | Performed by: INTERNAL MEDICINE

## 2019-08-02 PROCEDURE — 90471 IMMUNIZATION ADMIN: CPT | Performed by: INTERNAL MEDICINE

## 2019-08-02 PROCEDURE — 77067 SCR MAMMO BI INCL CAD: CPT | Mod: TC

## 2019-08-02 PROCEDURE — 77063 BREAST TOMOSYNTHESIS BI: CPT | Mod: TC

## 2019-08-02 RX ORDER — MULTIVITAMIN
1 TABLET ORAL DAILY
COMMUNITY
Start: 2019-08-02

## 2019-08-02 ASSESSMENT — PATIENT HEALTH QUESTIONNAIRE - PHQ9
SUM OF ALL RESPONSES TO PHQ QUESTIONS 1-9: 0
5. POOR APPETITE OR OVEREATING: NOT AT ALL
5. POOR APPETITE OR OVEREATING: NOT AT ALL

## 2019-08-02 ASSESSMENT — ANXIETY QUESTIONNAIRES
2. NOT BEING ABLE TO STOP OR CONTROL WORRYING: NOT AT ALL
5. BEING SO RESTLESS THAT IT IS HARD TO SIT STILL: NOT AT ALL
1. FEELING NERVOUS, ANXIOUS, OR ON EDGE: NOT AT ALL
6. BECOMING EASILY ANNOYED OR IRRITABLE: NOT AT ALL
GAD7 TOTAL SCORE: 0
IF YOU CHECKED OFF ANY PROBLEMS ON THIS QUESTIONNAIRE, HOW DIFFICULT HAVE THESE PROBLEMS MADE IT FOR YOU TO DO YOUR WORK, TAKE CARE OF THINGS AT HOME, OR GET ALONG WITH OTHER PEOPLE: NOT DIFFICULT AT ALL
7. FEELING AFRAID AS IF SOMETHING AWFUL MIGHT HAPPEN: NOT AT ALL
5. BEING SO RESTLESS THAT IT IS HARD TO SIT STILL: NOT AT ALL
3. WORRYING TOO MUCH ABOUT DIFFERENT THINGS: NOT AT ALL
GAD7 TOTAL SCORE: 0
6. BECOMING EASILY ANNOYED OR IRRITABLE: NOT AT ALL
7. FEELING AFRAID AS IF SOMETHING AWFUL MIGHT HAPPEN: NOT AT ALL
3. WORRYING TOO MUCH ABOUT DIFFERENT THINGS: NOT AT ALL
2. NOT BEING ABLE TO STOP OR CONTROL WORRYING: NOT AT ALL
1. FEELING NERVOUS, ANXIOUS, OR ON EDGE: NOT AT ALL
IF YOU CHECKED OFF ANY PROBLEMS ON THIS QUESTIONNAIRE, HOW DIFFICULT HAVE THESE PROBLEMS MADE IT FOR YOU TO DO YOUR WORK, TAKE CARE OF THINGS AT HOME, OR GET ALONG WITH OTHER PEOPLE: NOT DIFFICULT AT ALL

## 2019-08-02 ASSESSMENT — ENCOUNTER SYMPTOMS
PARESTHESIAS: 0
NAUSEA: 0
BREAST MASS: 0
CHILLS: 0
HEMATURIA: 0
FEVER: 0
COUGH: 0
WEAKNESS: 0
SHORTNESS OF BREATH: 0
DYSURIA: 0
FREQUENCY: 0
MYALGIAS: 0
JOINT SWELLING: 0
HEADACHES: 0
HEMATOCHEZIA: 0
DIZZINESS: 0
HEARTBURN: 0
ABDOMINAL PAIN: 0
CONSTIPATION: 0
NERVOUS/ANXIOUS: 0
DIARRHEA: 0
ARTHRALGIAS: 0
SORE THROAT: 0
PALPITATIONS: 1
EYE PAIN: 0

## 2019-08-03 ASSESSMENT — ANXIETY QUESTIONNAIRES: GAD7 TOTAL SCORE: 0

## 2019-09-30 ENCOUNTER — HEALTH MAINTENANCE LETTER (OUTPATIENT)
Age: 39
End: 2019-09-30

## 2020-03-15 ENCOUNTER — HEALTH MAINTENANCE LETTER (OUTPATIENT)
Age: 40
End: 2020-03-15

## 2020-06-09 ENCOUNTER — COMMUNICATION - HEALTHEAST (OUTPATIENT)
Dept: CARDIOLOGY | Facility: CLINIC | Age: 40
End: 2020-06-09

## 2020-06-10 ENCOUNTER — OFFICE VISIT - HEALTHEAST (OUTPATIENT)
Dept: CARDIOLOGY | Facility: CLINIC | Age: 40
End: 2020-06-10

## 2020-06-10 DIAGNOSIS — R79.89 ELEVATED BRAIN NATRIURETIC PEPTIDE (BNP) LEVEL: ICD-10-CM

## 2020-06-10 DIAGNOSIS — R07.89 CHEST DISCOMFORT: ICD-10-CM

## 2020-06-10 LAB — BNP SERPL-MCNC: 43 PG/ML (ref 0–64)

## 2021-01-15 ENCOUNTER — HEALTH MAINTENANCE LETTER (OUTPATIENT)
Age: 41
End: 2021-01-15

## 2021-04-01 ENCOUNTER — IMMUNIZATION (OUTPATIENT)
Dept: PEDIATRICS | Facility: CLINIC | Age: 41
End: 2021-04-01
Payer: COMMERCIAL

## 2021-04-01 PROCEDURE — 91300 PR COVID VAC PFIZER DIL RECON 30 MCG/0.3 ML IM: CPT

## 2021-04-01 PROCEDURE — 0001A PR COVID VAC PFIZER DIL RECON 30 MCG/0.3 ML IM: CPT

## 2021-04-22 ENCOUNTER — IMMUNIZATION (OUTPATIENT)
Dept: PEDIATRICS | Facility: CLINIC | Age: 41
End: 2021-04-22
Attending: INTERNAL MEDICINE
Payer: COMMERCIAL

## 2021-04-22 PROCEDURE — 0002A PR COVID VAC PFIZER DIL RECON 30 MCG/0.3 ML IM: CPT

## 2021-04-22 PROCEDURE — 91300 PR COVID VAC PFIZER DIL RECON 30 MCG/0.3 ML IM: CPT

## 2021-06-04 VITALS
SYSTOLIC BLOOD PRESSURE: 110 MMHG | OXYGEN SATURATION: 98 % | BODY MASS INDEX: 34.9 KG/M2 | DIASTOLIC BLOOD PRESSURE: 72 MMHG | HEART RATE: 68 BPM | WEIGHT: 216.2 LBS

## 2021-06-08 NOTE — TELEPHONE ENCOUNTER
Wellness Screening Tool  Symptom Screening:  Do you have one of the following NEW symptoms:    Fever (subjective or >100.0)?  No    A new cough?  No    Shortness of breath?  No     Chills? No     New loss of taste or smell? No     Generalized body aches? No     New persistent headache? No     New sore throat? No     Nausea, vomiting, or diarrhea?  No    Within the past 3 weeks, have you been exposed to someone with a known positive illness below:    COVID-19 (known or suspected)?  No    Chicken pox?  No    Mealses?  No    Pertussis?  No    Patient notified of visitor restrictions: Yes  Pt informed to wear a mask: Yes  Pt notified if they develop any symptoms listed above, prior to their appointment, they are to call the clinic directly at [phone] for further instructions.  Yes  Patient's appointment status: Patient will be seen in clinic as scheduled on Wed. 6-10-20 @ 0850 in [facility] with Dr. Merchant.  mg   Patient reported that sx of chest discomfort have returned since being seen in ED and questioned if she should be taking daily ASA - informed patient that cardiologist usually recommends daily ASA if dx w/CAD and that further recommendations would be provided at visit - understanding verbalized.

## 2021-06-08 NOTE — TELEPHONE ENCOUNTER
----- Message from Wendy Merchant MD sent at 6/9/2020  7:50 AM CDT -----  Regarding: RE: [facility] referral  Can be seen in clinic.    KML  ----- Message -----  From: Catherine España RN  Sent: 6/8/2020   3:24 PM CDT  To: Wedny Merchant MD  Subject: [facility] referral                                     Pt referred to [facility] by [facility] [place], [place] 6-8 - please review chart and determine type of visit that should be scheduled.  Thanks  mg

## 2021-06-29 NOTE — PROGRESS NOTES
Progress Notes by Wendy Merchant MD at 6/10/2020  8:50 AM     Author: Wendy Merchant MD Service: -- Author Type: Physician    Filed: 6/10/2020  9:47 AM Encounter Date: 6/10/2020 Status: Signed    : Wendy Merchant MD (Physician)           Thank you, Dr. Elliott, for asking the [facility] team to see Ms. Ancelmo Verduzco in the rapid access clinic to evaluate elevated BNP and chest discomfort.    Assessment/Recommendations   Assessment:    1.  Chest discomfort, etiology unclear as EKG and troponin levels in the ER were within normal limits.  She does recall eating some spicy food earlier that evening with subsequent burning in her chest raising a question of whether it may have been acid reflux.  No history of similar chest discomfort with physical activity.  There is a family history of early heart disease in her father at the age of 39 and she does have a history of smoking.  At this point, suggested an echocardiogram to assess for any evidence of left ventricular dysfunction or wall motion abnormality given the elevated BNP.  2.  Elevated BNP, etiology unclear as patient reports no prior history of heart disease, orthopnea, PND, lower extremity edema or hypertension.  Have recommended repeat BNP be drawn today along with an echocardiogram to assess for any structural abnormality.  She is agreeable and this will be arranged.  3.  Intermittent left arm pain, etiology unclear.  She does not note clear precipitation by physical activity or obvious muscle strain.  Could consider imaging stress study if echocardiogram unrevealing.  4.  Tobacco use.  Recommended smoking cessation.    Plan:  1.  Recheck BNP today  2.  Schedule outpatient echocardiogram with further recommendations to follow       History of Present Illness    Ms. Ancelmo Verduzco is a 39 y.o. female with unremarkable past medical history but a family history of early heart disease in her father who presented to the ED recently for  evaluation of chest discomfort.  By the time of her presentation, the discomfort had subsided.  Her ECG showed no acute changes and 2 troponin levels were within normal limits.  They did obtain a BNP as well which was found to be elevated.  No evidence of CHF by chest x-ray or any symptoms of heart failure.  Because of the elevated BNP, she was referred to the rapid access clinic.  Patient reports no recurrence of the chest discomfort which prompted her ER visit.  She denies orthopnea, PND or lower extremity edema.  She does occasionally note higher heart rates of around 100 when she is at rest; otherwise, no arrhythmias.  She also reports infrequent episodes of left upper arm discomfort which she states is deep.  Does not appear to be precipitated by physical activity nor does it occur after heavy lifting where muscle strain may be a possibility.  She denies any associated chest discomfort or shortness of breath with these episodes.    ECG (personally reviewed): ECG x2 in the ED demonstrated normal sinus rhythm without acute changes.    Cardiac Imaging Studies (personally reviewed): No prior cardiac imaging     Physical Examination Review of Systems   Vitals:    06/10/20 0901   BP: 110/72   Pulse: 68   SpO2: 98%     Body mass index is 34.9 kg/m .  Wt Readings from Last 3 Encounters:   06/10/20 216 lb 3.2 oz (98.1 kg)   06/08/20 210 lb (95.3 kg)     General Appearance:   Awake, Alert, No acute distress.   HEENT:  No scleral icterus; the mucous membranes were pink and moist.   Neck: No cervical bruits or jugular venous distention    Chest: The spine was straight. The chest was symmetric.   Lungs:   Respirations unlabored; the lungs are clear to auscultation. No wheezing   Cardiovascular:    Regular rate and rhythm.  S1, S2 normal.  No murmur or gallop   Abdomen:  No organomegaly, masses, bruits, or tenderness. Bowels sounds are present   Extremities:  No peripheral edema, clubbing or cyanosis.  Distal pulses 2+ and  symmetric.   Skin: No xanthelasma. Warm, Dry.   Musculoskeletal: No tenderness.   Neurologic: Mood and affect are appropriate.    General: WNL     Ears/Nose/Throat: WNL  Lungs: WNL  Heart: Arm Pain, Irregular Heartbeat(palpitations)  Stomach: Heartburn  Bladder: WNL  Muscle/Joints: WNL  Skin: WNL  Nervous System: Daytime Sleepiness  Mental Health: WNL     Blood: WNL     Medical History  Surgical History Family History Social History   No past medical history on file. No past surgical history on file. Family History   Problem Relation Age of Onset   ? Acute Myocardial Infarction Father 39   ? CABG Father 39   ? Kidney failure Father     Social History     Socioeconomic History   ? Marital status: Single     Spouse name: Not on file   ? Number of children: Not on file   ? Years of education: Not on file   ? Highest education level: Not on file   Occupational History   ? Not on file   Social Needs   ? Financial resource strain: Not on file   ? Food insecurity     Worry: Not on file     Inability: Not on file   ? Transportation needs     Medical: Not on file     Non-medical: Not on file   Tobacco Use   ? Smoking status: Current Every Day Smoker     Packs/day: 0.50   ? Smokeless tobacco: Never Used   Substance and Sexual Activity   ? Alcohol use: Yes     Alcohol/week: 10.0 standard drinks     Types: 10 Cans of beer per week   ? Drug use: Never   ? Sexual activity: Not on file   Lifestyle   ? Physical activity     Days per week: Not on file     Minutes per session: Not on file   ? Stress: Not on file   Relationships   ? Social connections     Talks on phone: Not on file     Gets together: Not on file     Attends Voodoo service: Not on file     Active member of club or organization: Not on file     Attends meetings of clubs or organizations: Not on file     Relationship status: Not on file   ? Intimate partner violence     Fear of current or ex partner: Not on file     Emotionally abused: Not on file     Physically  abused: Not on file     Forced sexual activity: Not on file   Other Topics Concern   ? Not on file   Social History Narrative   ? Not on file          Medications  Allergies   Current Outpatient Medications   Medication Sig Dispense Refill   ? levonorgestreL (MIRENA) 20 mcg/24 hours (5 yrs) 52 mg IUD 1 each by Intrauterine route.       No current facility-administered medications for this visit.       No Known Allergies      Lab Results    Chemistry/lipid CBC Cardiac Enzymes/BNP/TSH/INR   Lab Results   Component Value Date    CREATININE 0.69 06/08/2020    BUN 14 06/08/2020    K 4.0 06/08/2020     06/08/2020     (H) 06/08/2020    CO2 24 06/08/2020    Lab Results   Component Value Date    WBC 7.6 06/08/2020    HGB 13.7 06/08/2020    HCT 40.2 06/08/2020    MCV 94 06/08/2020     06/08/2020    Lab Results   Component Value Date    TROPONINI <0.01 06/08/2020     (H) 06/08/2020

## 2021-10-24 ENCOUNTER — HEALTH MAINTENANCE LETTER (OUTPATIENT)
Age: 41
End: 2021-10-24

## 2022-02-13 ENCOUNTER — HEALTH MAINTENANCE LETTER (OUTPATIENT)
Age: 42
End: 2022-02-13

## 2022-10-15 ENCOUNTER — HEALTH MAINTENANCE LETTER (OUTPATIENT)
Age: 42
End: 2022-10-15

## 2023-03-26 ENCOUNTER — HEALTH MAINTENANCE LETTER (OUTPATIENT)
Age: 43
End: 2023-03-26